# Patient Record
Sex: FEMALE
[De-identification: names, ages, dates, MRNs, and addresses within clinical notes are randomized per-mention and may not be internally consistent; named-entity substitution may affect disease eponyms.]

---

## 2018-04-26 ENCOUNTER — HOSPITAL ENCOUNTER (OUTPATIENT)
Dept: HOSPITAL 5 - CATHLABREC | Age: 58
Setting detail: OBSERVATION
LOS: 1 days | Discharge: HOME | End: 2018-04-27
Attending: INTERNAL MEDICINE | Admitting: INTERNAL MEDICINE
Payer: COMMERCIAL

## 2018-04-26 DIAGNOSIS — J44.9: ICD-10-CM

## 2018-04-26 DIAGNOSIS — G47.33: ICD-10-CM

## 2018-04-26 DIAGNOSIS — Z82.49: ICD-10-CM

## 2018-04-26 DIAGNOSIS — E66.9: ICD-10-CM

## 2018-04-26 DIAGNOSIS — I25.110: Primary | ICD-10-CM

## 2018-04-26 DIAGNOSIS — I44.69: ICD-10-CM

## 2018-04-26 DIAGNOSIS — Z87.891: ICD-10-CM

## 2018-04-26 DIAGNOSIS — E78.2: ICD-10-CM

## 2018-04-26 DIAGNOSIS — E11.9: ICD-10-CM

## 2018-04-26 LAB
BASOPHILS # (AUTO): 0.1 K/MM3 (ref 0–0.1)
BASOPHILS NFR BLD AUTO: 0.7 % (ref 0–1.8)
BUN SERPL-MCNC: 23 MG/DL (ref 7–17)
BUN/CREAT SERPL: 29 %
CALCIUM SERPL-MCNC: 8.7 MG/DL (ref 8.4–10.2)
EOSINOPHIL # BLD AUTO: 0.1 K/MM3 (ref 0–0.4)
EOSINOPHIL NFR BLD AUTO: 1.3 % (ref 0–4.3)
HCT VFR BLD CALC: 39.4 % (ref 30.3–42.9)
HEMOLYSIS INDEX: 6
HGB BLD-MCNC: 12.9 GM/DL (ref 10.1–14.3)
INR PPP: 0.82 (ref 0.87–1.13)
LYMPHOCYTES # BLD AUTO: 1.5 K/MM3 (ref 1.2–5.4)
LYMPHOCYTES NFR BLD AUTO: 15.8 % (ref 13.4–35)
MCH RBC QN AUTO: 27 PG (ref 28–32)
MCHC RBC AUTO-ENTMCNC: 33 % (ref 30–34)
MCV RBC AUTO: 81 FL (ref 79–97)
MONOCYTES # (AUTO): 0.6 K/MM3 (ref 0–0.8)
MONOCYTES % (AUTO): 5.7 % (ref 0–7.3)
PLATELET # BLD: 221 K/MM3 (ref 140–440)
RBC # BLD AUTO: 4.85 M/MM3 (ref 3.65–5.03)

## 2018-04-26 PROCEDURE — 85025 COMPLETE CBC W/AUTO DIFF WBC: CPT

## 2018-04-26 PROCEDURE — 92921: CPT

## 2018-04-26 PROCEDURE — 84484 ASSAY OF TROPONIN QUANT: CPT

## 2018-04-26 PROCEDURE — 93458 L HRT ARTERY/VENTRICLE ANGIO: CPT

## 2018-04-26 PROCEDURE — C1725 CATH, TRANSLUMIN NON-LASER: HCPCS

## 2018-04-26 PROCEDURE — 96372 THER/PROPH/DIAG INJ SC/IM: CPT

## 2018-04-26 PROCEDURE — 93010 ELECTROCARDIOGRAM REPORT: CPT

## 2018-04-26 PROCEDURE — C1887 CATHETER, GUIDING: HCPCS

## 2018-04-26 PROCEDURE — 82550 ASSAY OF CK (CPK): CPT

## 2018-04-26 PROCEDURE — C1753 CATH, INTRAVAS ULTRASOUND: HCPCS

## 2018-04-26 PROCEDURE — 80048 BASIC METABOLIC PNL TOTAL CA: CPT

## 2018-04-26 PROCEDURE — 85610 PROTHROMBIN TIME: CPT

## 2018-04-26 PROCEDURE — 93005 ELECTROCARDIOGRAM TRACING: CPT

## 2018-04-26 PROCEDURE — C1769 GUIDE WIRE: HCPCS

## 2018-04-26 PROCEDURE — 82553 CREATINE MB FRACTION: CPT

## 2018-04-26 PROCEDURE — 92920 PRQ TRLUML C ANGIOP 1ART&/BR: CPT

## 2018-04-26 PROCEDURE — G0378 HOSPITAL OBSERVATION PER HR: HCPCS

## 2018-04-26 PROCEDURE — 92978 ENDOLUMINL IVUS OCT C 1ST: CPT

## 2018-04-26 PROCEDURE — 85347 COAGULATION TIME ACTIVATED: CPT

## 2018-04-26 PROCEDURE — 71045 X-RAY EXAM CHEST 1 VIEW: CPT

## 2018-04-26 PROCEDURE — C1894 INTRO/SHEATH, NON-LASER: HCPCS

## 2018-04-26 PROCEDURE — 82962 GLUCOSE BLOOD TEST: CPT

## 2018-04-26 PROCEDURE — 36415 COLL VENOUS BLD VENIPUNCTURE: CPT

## 2018-04-26 RX ADMIN — HEPARIN SODIUM ONE UNIT: 1000 INJECTION, SOLUTION INTRAVENOUS; SUBCUTANEOUS at 11:10

## 2018-04-26 RX ADMIN — METOPROLOL TARTRATE SCH MG: 25 TABLET, FILM COATED ORAL at 23:20

## 2018-04-26 RX ADMIN — RANOLAZINE SCH MG: 500 TABLET, EXTENDED RELEASE ORAL at 22:50

## 2018-04-26 RX ADMIN — HEPARIN SODIUM ONE UNIT: 1000 INJECTION, SOLUTION INTRAVENOUS; SUBCUTANEOUS at 11:53

## 2018-04-26 RX ADMIN — HEPARIN SODIUM ONE UNIT: 1000 INJECTION, SOLUTION INTRAVENOUS; SUBCUTANEOUS at 11:22

## 2018-04-26 NOTE — CARDIAC CATHERIZATION REPORT
REFERRING PHYSICIAN:  Devon Rogers MD



INDICATION FOR PROCEDURE:  The patient is a pleasant 57-year-old 

female with a history of proximal LAD disease, status post PCI with DS at

[facility] in June of last year.  She presents to my office yesterday with

crescendo unstable angina with accelerating symptoms.  She is referred for left

heart catheterization this morning.  She is compliant with her medications.  She

is on multiple antianginals.



Risks, benefits, alternatives discussed at length prior to obtaining informed

consent.



PROCEDURE IN DETAIL:  The patient was brought to the catheterization lab in a

postabsorptive state, prepped and draped in sterile fashion.  Lul's test in

right hand is normal.  A 2 mL of 2% lidocaine used to anesthetize the right

wrist.  A standard 6-Hebrew hydrophilic sheath used to cannulate the right

radial artery via modified Seldinger technique.  All exchanges performed to

exchange a J-tip guidewire.  JL3.5 catheter used to engage left main.  No

dampening or ventricularization.  Cineangiography performed in all projections. 

JR4 catheter was used to cross the aortic valve under fluoroscopic guidance. 

Left ventriculography free from 30 MURPHY and 30 PERLA projections via hand

injections, catheter flushed.  Manual pullback performed with continuous

pressure monitoring.  Catheter used to engage the right coronary.  No dampening

or ventricularization.  Cineangiography performed in all projections.



DATA:  Aortic pressure is 140/70, LV pressure is 140.  LVP of 20 mmHg.  Left

ventriculography revealed normal systolic performance with estimated ejection

fraction of 55-60%.  No evidence of aortic stenosis.



CORONARY ANATOMY:  This is a codominant system.  Left main is a short vessel, no

significant disease, bifurcates left anterior descending and left circumflex. 

Left circumflex is a moderate large vessel.  Left PDA.  No significant disease. 

No obstructive disease, 25% mid left circumflex identified.  LAD is a moderate

to large vessel, courses anterior intergroove, wraps around the apex.  There is

a stent in the proximal LAD, which is widely patent.  The stent jails a second

diagonal with a 99% subtotal occlusion proximally.  It is a very small vessel

_____ just proximal to the stent with what appears to be a 10-20% stenosis. 

There is a first diagonal, which appears to be _____ vessel, it is very small,

but has a 99% what appears to be ulcerated stenosis proximally.  The right

coronary is a moderate sized vessel, courses AV groove, distally bifurcates in

the posterior and posterolateral branch, no discrete stenosis identified.



At this point, considering she has accelerating continuing angina.  I believe

the first diagonal is likely a he culprit vessel.  It may be a combination of

the 2 diagonals.  These vessels are too small to perform FFR on form of

physiologic assessment, but they I believe are responsible and culprit for set

symptoms of unstable angina after thrombotic nature of the first diagonal is

noted.  At this point, we turned our attention to PCI.



I directly supervised the administration of moderate sedation with fentanyl and

Versed from 11:02 to 12:05.  There were no immediate complications.



PCI DETAILS:  EBU 3.5 guide used to engage the left main without difficulty.  No

dampening noted.  Additional heparin was given.  Abnormal ACT is confirmed.  The

patient is already on aspirin and Effient.  We used a [place] wire and placed

into the distal LAD.  First I went ahead and performed intravascular ultrasound

of the entire LAD including the proximal.  The stent in the proximal LAD is

widely patent.  The area of the band just proximal to the stent is widely

patent, mild concentric disease with a mean luminal area of approximately 5-6 mm

square.  No obstructive disease identified.  There are angiographically were on

ultrasound.  At this point, I turned my attention two small diagonals.  She did

have an episode of chest pain on the table.  First, I wired the first diagonal

with the [place] wire.  I used a 1.5 x 6 balloon to predilate the proximal first

diagonal at 8 ALEX for 30 seconds.  Excellent angiographic result 20% residual. 

I believe this vessel is too small to stent YVONNE 3 flow with no dissection. 

Next, I used [place] wire to cross through the stent and wire.  The jailed second

diagonal with 99% subtotal occlusion.  I dilated that vessel with a 1.5 x 8

compliant balloon for 30 seconds at 12 ALEX.  Excellent angiographic result, 0%

residual stenosis, no dissection.  Final angiogram reveals improved angiographic

characteristics, 0% residual stenosis in the second diagonal, 20% residual

stenosis in the first diagonal.  The patient is now chest pain free.  At this

point, we stopped the procedure.  No immediate complications were noted.  The

patient loaded with Effient and aspirin.



CONCLUSIONS:

1. Small vessel epicardial coronary disease as aforementioned the setting of

unstable angina/acute coronary syndrome.

A.  Patent proximal LAD stent

B.  A 25% stenosis proximal to LAD stent nonobstructive confirmed by

intravascular ultrasound.

C.  Successful PTCA of 99% at the thrombotic subtotal occlusion of very small

first diagonal with excellent final angiographic result 20% residual stenosis.

2. Successful PTCA of second diagonal a very small vessel with successful

angiographic result.  Both these diagonals were re too small to stent.

3. Patent left main, left circumflex and right coronary.

4. Normal LV function, estimated ejection fraction of 55-60%.



At this point, the patient is clinically improved, chest pain free now, continue

aspirin, statin therapy.  We will add Imdur therapy.  She was given a single

bolus of Aggrastat.  We will admit to tele for observation and standard radial

care.  Follow up with me in the office.  Results of procedure explained in

length to the patient and family.  All questions and concerns were addressed.





DD: 04/26/2018 12:05

DT: 04/26/2018 12:45

JOB# [id number]  [id number]

SANDIP/HUMA

## 2018-04-27 VITALS — SYSTOLIC BLOOD PRESSURE: 122 MMHG | DIASTOLIC BLOOD PRESSURE: 55 MMHG

## 2018-04-27 LAB
BASOPHILS # (AUTO): 0.1 K/MM3 (ref 0–0.1)
BASOPHILS NFR BLD AUTO: 0.7 % (ref 0–1.8)
BUN SERPL-MCNC: 17 MG/DL (ref 7–17)
BUN/CREAT SERPL: 28 %
CALCIUM SERPL-MCNC: 8.8 MG/DL (ref 8.4–10.2)
EOSINOPHIL # BLD AUTO: 0.1 K/MM3 (ref 0–0.4)
EOSINOPHIL NFR BLD AUTO: 1.3 % (ref 0–4.3)
HCT VFR BLD CALC: 38.9 % (ref 30.3–42.9)
HEMOLYSIS INDEX: 5
HGB BLD-MCNC: 12.7 GM/DL (ref 10.1–14.3)
LYMPHOCYTES # BLD AUTO: 1.5 K/MM3 (ref 1.2–5.4)
LYMPHOCYTES NFR BLD AUTO: 15.6 % (ref 13.4–35)
MCH RBC QN AUTO: 27 PG (ref 28–32)
MCHC RBC AUTO-ENTMCNC: 33 % (ref 30–34)
MCV RBC AUTO: 81 FL (ref 79–97)
MONOCYTES # (AUTO): 0.6 K/MM3 (ref 0–0.8)
MONOCYTES % (AUTO): 6.6 % (ref 0–7.3)
PLATELET # BLD: 218 K/MM3 (ref 140–440)
RBC # BLD AUTO: 4.81 M/MM3 (ref 3.65–5.03)

## 2018-04-27 RX ADMIN — METOPROLOL TARTRATE SCH MG: 25 TABLET, FILM COATED ORAL at 10:45

## 2018-04-27 RX ADMIN — RANOLAZINE SCH MG: 500 TABLET, EXTENDED RELEASE ORAL at 10:45

## 2018-04-27 NOTE — XRAY REPORT
Single view chest:



History: Post PCI.



Findings:



Cardiomegaly. trachea is midline. No consolidation, pneumothorax or 

pleural effusion.



Impression:



Cardiomegaly. No acute lung changes.

## 2018-04-27 NOTE — SHORT STAY SUMMARY
Short Stay Documentation


Date of service: 04/27/18





- History


H&P: obtained from office





- Allergies and Medications


Current Medications: 


 Allergies





adhesive tape Allergy (Unverified 04/26/18 08:20)


 Hives


Penicillins Allergy (Unverified 04/26/18 08:20)


 Hives





 Home Medications











 Medication  Instructions  Recorded  Confirmed  Last Taken  Type


 


Empagliflozin/Metformin HCl 2 tab PO DAILY 04/26/18 04/26/18 04/26/18 06:00 

History





[Synjardy Xr 12.5-1,000 mg Tab]    2 tabs 


 


Gabapentin [Neurontin] 300 mg PO HS 04/26/18 04/26/18 04/25/18 History





    300mg 


 


ISOSORBIDE MONOnitrate [Imdur ER] 30 mg PO DAILY 04/26/18 04/26/18 04/25/18 

History





    30mg 


 


Insulin Lispro [Humalog 100 12 units SQ TID 04/26/18 04/26/18 04/25/18 History





UNITS/ML Kwikpen]    12units 


 


Ipratropium/Albuterol Sulfate 2 puff INHALATION PRN PRN 04/26/18 04/26/18 04/25/ 18 History





[Combivent Respimat]    1 puff 


 


Lisinopril [Zestril TAB] 10 mg PO DAILY 04/26/18 04/26/18 04/25/18 History





    10mg 


 


Metoprolol [Lopressor TAB] 25 mg PO BID 04/26/18 04/26/18 04/25/18 History





    25mg 


 


Mometasone/Formoterol [Dulera 200 1 dose INHALATION BID 04/26/18 04/26/18 04/25/ 18 History





Mcg/5 Mcg Inhaler]    1 dose 


 


Prasugrel HCl 10 mg PO DAILY 04/26/18 04/26/18 04/25/18 History





    10mg 


 


Ranolazine [Ranexa] 500 mg PO BID 04/26/18 04/26/18 04/25/18 History





    500mg 


 


Trazodone HCl 150 mg PO HS 04/26/18 04/26/18 04/25/18 History





    150mg 








Active Medications





Aspirin (Baby Aspirin)  81 mg PO QDAY FABIO


Insulin Human Lispro (Humalog)  0 unit SUB-Q ACHS FABIO; Protocol


   Last Admin: 04/26/18 22:55 Dose:  3 unit


Isosorbide Mononitrate (Imdur)  30 mg PO DAILY [facility]


Lisinopril (Zestril)  10 mg PO DAILY [facility]


Metoprolol Tartrate (Lopressor)  25 mg PO BID [facility]


   Last Admin: 04/26/18 23:20 Dose:  25 mg


Prasugrel (Effient)  10 mg PO QDAY [facility]


Ranolazine (Ranexa Er)  500 mg PO BID [facility]


   Last Admin: 04/26/18 22:50 Dose:  500 mg











- Brief post op/procedure progress note


Date of procedure: 04/26/18


Pre-op diagnosis: chest pain 


Procedure: 


left heart cath and balloon angioplasty





- Hospital course


Hospital course: 


The patient is a 57 year old female who presented to Dr. Shey Ramirez's office on 

4/25/18 with complaints of crescendo chest pain. She has a very strong family H/

O PCI of proximal LAD last year at [facility] with DILLON and thus was referred for LHC. 

She presented as an outpatient on 4/26 and underwent LHC and balloon 

angioplasty to diagonal 1 and diagonal 2. Both vessels were too small to stent. 

She was observed on telemetry overnight and remained chest pain free. She will 

be discharged home today in stable condition on ASA, effient, imdur, ranexa and 

crestor. Follow up with Dr. Shey Ramirez in the [place] office on 5/9/18 at 1

:30 pm. 








- Disposition


Condition at discharge: Stable





- Discharge Diagnoses


(1) CAD (coronary artery disease)


Status: Acute   





(2) S/P PTCA (percutaneous transluminal coronary angioplasty)


Status: Acute   





(3) Unstable angina


Status: Resolved   





Short Stay Discharge Plan


Activity: advance as tolerated


Diet: low fat, low cholesterol


Wound: keep clean and dry


Follow up with: 


CÉSAR PEARSON NP [Primary Care Provider] - 7 Days


SHEY RAMIREZ MD [Staff Physician] - 05/09/18 1:30 pm ([place] office)


Prescriptions: 


Aspirin [Aspirin BABY CHEW TAB] 81 mg PO QDAY #30 tab.chew


Rosuvastatin Calcium [Crestor] 20 mg PO QHS #30 tablet